# Patient Record
Sex: MALE | Race: WHITE | NOT HISPANIC OR LATINO
[De-identification: names, ages, dates, MRNs, and addresses within clinical notes are randomized per-mention and may not be internally consistent; named-entity substitution may affect disease eponyms.]

---

## 2017-01-18 ENCOUNTER — RX RENEWAL (OUTPATIENT)
Age: 27
End: 2017-01-18

## 2017-06-07 ENCOUNTER — RX RENEWAL (OUTPATIENT)
Age: 27
End: 2017-06-07

## 2017-10-27 ENCOUNTER — RX RENEWAL (OUTPATIENT)
Age: 27
End: 2017-10-27

## 2018-09-04 ENCOUNTER — RX RENEWAL (OUTPATIENT)
Age: 28
End: 2018-09-04

## 2019-05-13 ENCOUNTER — RX RENEWAL (OUTPATIENT)
Age: 29
End: 2019-05-13

## 2019-09-12 ENCOUNTER — RX RENEWAL (OUTPATIENT)
Age: 29
End: 2019-09-12

## 2019-09-13 ENCOUNTER — RX RENEWAL (OUTPATIENT)
Age: 29
End: 2019-09-13

## 2020-09-17 ENCOUNTER — APPOINTMENT (OUTPATIENT)
Dept: NEUROLOGY | Facility: CLINIC | Age: 30
End: 2020-09-17

## 2020-09-25 ENCOUNTER — APPOINTMENT (OUTPATIENT)
Dept: NEUROLOGY | Facility: CLINIC | Age: 30
End: 2020-09-25
Payer: SELF-PAY

## 2020-09-25 VITALS
SYSTOLIC BLOOD PRESSURE: 129 MMHG | BODY MASS INDEX: 20.73 KG/M2 | WEIGHT: 140 LBS | HEIGHT: 69 IN | HEART RATE: 91 BPM | DIASTOLIC BLOOD PRESSURE: 80 MMHG

## 2020-09-25 VITALS — TEMPERATURE: 97.9 F

## 2020-09-25 DIAGNOSIS — G40.409 OTHER GENERALIZED EPILEPSY AND EPILEPTIC SYNDROMES, NOT INTRACTABLE, W/OUT STATUS EPILEPTICUS: ICD-10-CM

## 2020-09-25 DIAGNOSIS — G40.209 LOCALIZATION-RELATED (FOCAL) (PARTIAL) SYMPTOMATIC EPILEPSY AND EPILEPTIC SYNDROMES WITH COMPLEX PARTIAL SEIZURES, NOT INTRACTABLE, W/OUT STATUS EPILEPTICUS: ICD-10-CM

## 2020-09-25 DIAGNOSIS — F17.200 NICOTINE DEPENDENCE, UNSPECIFIED, UNCOMPLICATED: ICD-10-CM

## 2020-09-25 DIAGNOSIS — Z82.0 FAMILY HISTORY OF EPILEPSY AND OTHER DISEASES OF THE NERVOUS SYSTEM: ICD-10-CM

## 2020-09-25 PROCEDURE — 99204 OFFICE O/P NEW MOD 45 MIN: CPT

## 2020-09-25 RX ORDER — LEVETIRACETAM 500 MG/1
500 TABLET, FILM COATED, EXTENDED RELEASE ORAL
Qty: 60 | Refills: 0 | Status: COMPLETED | COMMUNITY
Start: 2020-09-25

## 2020-09-28 ENCOUNTER — APPOINTMENT (OUTPATIENT)
Dept: NEUROLOGY | Facility: CLINIC | Age: 30
End: 2020-09-28

## 2020-09-29 PROBLEM — G40.409: Status: ACTIVE | Noted: 2020-09-29

## 2020-09-29 NOTE — HISTORY OF PRESENT ILLNESS
[FreeTextEntry1] : 29 y/o R-H man with h/o head trauma and seizures presents for third opinion on seizure disorder.  First seizure in at age of 15 one week after a snowboarding accident with L. fronto-central supperficial head injury.  1st seizure, PT was in school felt brant carranza, kept striking out at baseball plate, was told had few minutes of generalized convulsions w/o incontinence, tongue bite lasting no more than 5 minutes a/w about 1 minute of post-ictal confusion but prolonged fatigue and HA ranging from few hours to entire day.  Was started on trileptal 600 q12h from 6141-6695 until switched to keppra 750 BID which was his dose until last 1 year when he self-lowered it to 750 once daily secondary to fatigue.  On the trileptal, had 6 or 7 seizures total on trileptal.  Would be exacerbated by sports especially football.  Aura described as peculiar generalized feeling with impaired consciousness would go to a wall, sit down then in 1 minute would generalized shaking.  Went to [facility] went to [facility] got MRI.  Came to 611 started on Keppra 750 BID.  Since starting it, if missed medication >2 days, which has occurred 3 times.  Unsure if has had any while sleeping, though records note has had one while asleep, witnessed by mother, which he does not recall.  Seizure semiology previously documented as described as aura of déjà vu with subsequent staring, head version to the left, falling down and mild clonic movements lasting about 2 minutes in time which are 85% diurnal, but occasionally nocturnal.  Provoking factors included sleep deprivation, physical exertion and alcohol.  Seizure frequency had increased from 2227-2376 prompting him to seek second opinion from Dr. Schaeffer.  Still smokes 1ppd. \par \par FH of older sister having febrile seizures and eventually passed away and had VNS at age 31.  Does not drive.  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  Last seizure 3 years ago due to no medication.

## 2020-09-29 NOTE — DISCUSSION/SUMMARY
[FreeTextEntry1] : 29 y/o R-HM with focal seizures with impaired awareness with secondary bilateral tonic clonic generalization perhaps originating in L. mesial temporal/frontal lobe.  Unclear if head turning once documented was head version vs simply head turn.   \par \par Plan\par Switch Keppra from 750 once daily to Keppra 500 XR BID\par Repeat ambulatory EEG.  If EEG still demonstrate persistent abnormal activity, will likely not be able to taper off medications. \par Return to clinic in 3 months. \par

## 2020-09-29 NOTE — PHYSICAL EXAM
[General Appearance - Alert] : alert [General Appearance - In No Acute Distress] : in no acute distress [Person] : oriented to person [Place] : oriented to place [Time] : oriented to time [Concentration Intact] : normal concentrating ability [Visual Intact] : visual attention was ~T not ~L decreased [Naming Objects] : no difficulty naming common objects [Repeating Phrases] : no difficulty repeating a phrase [Writing A Sentence] : no difficulty writing a sentence [Fluency] : fluency intact [Comprehension] : comprehension intact [Reading] : reading intact [Past History] : adequate knowledge of personal past history [Cranial Nerves Optic (II)] : visual acuity intact bilaterally,  visual fields full to confrontation, pupils equal round and reactive to light [Cranial Nerves Oculomotor (III)] : extraocular motion intact [Cranial Nerves Trigeminal (V)] : facial sensation intact symmetrically [Cranial Nerves Facial (VII)] : face symmetrical [Cranial Nerves Vestibulocochlear (VIII)] : hearing was intact bilaterally [Cranial Nerves Glossopharyngeal (IX)] : tongue and palate midline [Cranial Nerves Accessory (XI - Cranial And Spinal)] : head turning and shoulder shrug symmetric [Cranial Nerves Hypoglossal (XII)] : there was no tongue deviation with protrusion [Motor Tone] : muscle tone was normal in all four extremities [Motor Strength] : muscle strength was normal in all four extremities [No Muscle Atrophy] : normal bulk in all four extremities [Motor Handedness Right-Handed] : the patient is right hand dominant [Sensation Tactile Decrease] : light touch was intact [Abnormal Walk] : normal gait [Balance] : balance was intact [2+] : Ankle jerk left 2+ [Sclera] : the sclera and conjunctiva were normal [Extraocular Movements] : extraocular movements were intact [Past-pointing] : there was no past-pointing [Tremor] : no tremor present [Plantar Reflex Right Only] : normal on the right [Plantar Reflex Left Only] : normal on the left [FreeTextEntry6] : Mild R. nasolabial fold flattening, mild resting tremor of R. hand pinky worse with posture.

## 2020-10-30 ENCOUNTER — APPOINTMENT (OUTPATIENT)
Dept: NEUROLOGY | Facility: CLINIC | Age: 30
End: 2020-10-30

## 2020-11-12 RX ORDER — LEVETIRACETAM 500 MG/1
500 TABLET, FILM COATED, EXTENDED RELEASE ORAL
Qty: 60 | Refills: 5 | Status: ACTIVE | COMMUNITY
Start: 2020-09-25 | End: 1900-01-01

## 2020-11-30 ENCOUNTER — APPOINTMENT (OUTPATIENT)
Dept: NEUROLOGY | Facility: CLINIC | Age: 30
End: 2020-11-30

## 2022-11-04 ENCOUNTER — EMERGENCY (EMERGENCY)
Facility: HOSPITAL | Age: 32
LOS: 1 days | Discharge: LEFT BEFORE DISCHARGE | End: 2022-11-04
Attending: EMERGENCY MEDICINE

## 2022-11-04 VITALS
TEMPERATURE: 97 F | HEART RATE: 95 BPM | WEIGHT: 139.99 LBS | SYSTOLIC BLOOD PRESSURE: 110 MMHG | OXYGEN SATURATION: 98 % | DIASTOLIC BLOOD PRESSURE: 71 MMHG | RESPIRATION RATE: 18 BRPM | HEIGHT: 69 IN

## 2022-11-04 PROCEDURE — L9991: CPT

## 2022-11-04 NOTE — ED ADULT TRIAGE NOTE - CHIEF COMPLAINT QUOTE
walked  in  by  EMS  w/  c/o  seizure  @  a [employer] fell  backwards  non  compliant  with  his  medication for  seizure.

## 2025-07-25 DIAGNOSIS — L02.91 CUTANEOUS ABSCESS, UNSPECIFIED: ICD-10-CM

## 2025-07-25 RX ORDER — AMOXICILLIN AND CLAVULANATE POTASSIUM 875; 125 MG/1; MG/1
875-125 TABLET, COATED ORAL
Qty: 14 | Refills: 0 | Status: ACTIVE | COMMUNITY
Start: 2025-07-25 | End: 1900-01-01